# Patient Record
(demographics unavailable — no encounter records)

---

## 2020-04-07 NOTE — CT
CT ABDOMEN AND PELVIS PERFORMED WITHOUT CONTRAST ENHANCEMENT:

 

Date:  04/07/2020

 

HISTORY:  

Abdominal pain. 

 

FINDINGS:

The lung bases are clear of any infiltrative process. 

 

The liver, spleen, pancreas, and gallbladder regions all appear unremarkable. 

 

Right and left adrenal glands, and right and left kidneys are normal in size. No obstruction. No rowena
l calculi. There is no significant periaortic or mesenteric adenopathy noted. 

 

CT of pelvis was performed without contrast enhancement. The appendix is normal. Follicles seen invol
ving the adnexa. No adenopathy or mass. 

 

No significant bony findings. 

 

IMPRESSION: 

Unremarkable CT of the abdomen and pelvis. 

 

 

POS: [facility]